# Patient Record
(demographics unavailable — no encounter records)

---

## 2017-05-20 NOTE — OP
PATIENT NAME:  TASHA FUNEZ                         MEDICAL RECORD: [id number]
:87                                             LOCATION:D.OPS          
                                                         ADMISSION DATE:        
SURGEON:  MARTINEZ HDEZ MD        
 
 
DATE OF OPERATION:  2017
 
PREOPERATIVE DIAGNOSIS:  Medial meniscus tear of the left knee.
 
POSTOPERATIVE DIAGNOSIS:  Medial meniscus tear of the left knee.
 
PROCEDURE:  Arthroscopic partial medial meniscectomy.
 
SURGEON:  Martinez Hdez MD.
 
ANESTHESIA:  General.
 
INTRAOPERATIVE COMPLICATIONS:  None.
 
SUMMARY OF PATHOLOGIC FINDINGS:  The patient has a complex tear of the posterior
horn of medial meniscus consistent with preoperative MRI.
 
OPERATIVE SUMMARY IN DETAIL:  After obtaining the appropriate preoperative
orthopedic surgery consent as well as anesthetic consultation, evaluation and
clearance, the patient was brought to the operating room and placed on the
operating table in supine position.  After adequate general laryngeal mask was
administered, tourniquet was placed about the proximal aspect of left lower
extremity.  Left lower extremity was then prepped and draped in routine sterile
fashion.  The leg was elevated and exsanguinated, tourniquet inflated to 350
mmHg.  A routine inferolateral portal was established followed by superomedial
portal and inferomedial portal.  Diagnostic arthroscopy showed the patient to
have complex tear of the posterior horn of the medial meniscus.  Combination of
a meniscotome as well as a full radius resector was utilized to debride the
meniscus back to stable meniscal elements.  There were no other chondral lesions
noted and the lateral compartment was in overall good shape.  The knee was then
insufflated with 30 cc of 0.25% Marcaine with epinephrine and 40 mg of
Depo-Medrol.  Arthroscopy portals were closed in routine interrupted fashion
using 4-0 Prolene.  Sterile dressings were applied.  Tourniquet was deflated. 
The patient was awakened, taken to recovery in stable condition.  All final
needle and sponge counts were correct.
 
TRANSINT:[id number] Voice Confirmation ID: [id number] DOCUMENT ID: [id number]
                                           
                                           LEMUEL EVANS, MARTINEZ BRASHER        
 
 
 
Electronically Signed by MARTINEZ HDEZ MD on 17 at 1420
 
CC:                                                             [id number]
DICTATION DATE: 17 0935     :     17 1306      [facility] 
                                                                      [address]
[place] [address]